# Patient Record
Sex: MALE | Race: WHITE | Employment: OTHER | URBAN - METROPOLITAN AREA
[De-identification: names, ages, dates, MRNs, and addresses within clinical notes are randomized per-mention and may not be internally consistent; named-entity substitution may affect disease eponyms.]

---

## 2017-02-06 ENCOUNTER — OFFICE VISIT (OUTPATIENT)
Dept: INTERNAL MEDICINE CLINIC | Facility: CLINIC | Age: 82
End: 2017-02-06

## 2017-02-06 VITALS
BODY MASS INDEX: 27.62 KG/M2 | WEIGHT: 176 LBS | HEIGHT: 67 IN | SYSTOLIC BLOOD PRESSURE: 132 MMHG | RESPIRATION RATE: 16 BRPM | DIASTOLIC BLOOD PRESSURE: 88 MMHG | HEART RATE: 76 BPM

## 2017-02-06 DIAGNOSIS — I10 ESSENTIAL HYPERTENSION WITH GOAL BLOOD PRESSURE LESS THAN 140/90: Primary | ICD-10-CM

## 2017-02-06 DIAGNOSIS — N40.0 BENIGN NON-NODULAR PROSTATIC HYPERPLASIA WITHOUT LOWER URINARY TRACT SYMPTOMS: ICD-10-CM

## 2017-02-06 DIAGNOSIS — N18.30 KIDNEY DISEASE, CHRONIC, STAGE III (GFR 30-59 ML/MIN) (HCC): ICD-10-CM

## 2017-02-06 PROCEDURE — 99214 OFFICE O/P EST MOD 30 MIN: CPT | Performed by: INTERNAL MEDICINE

## 2017-02-06 PROCEDURE — G0463 HOSPITAL OUTPT CLINIC VISIT: HCPCS | Performed by: INTERNAL MEDICINE

## 2017-02-06 NOTE — PROGRESS NOTES
Katie Hart is a 80year old male. HPI:   1. Essential hypertension with goal blood pressure less than 140/90    Patient has been following low salt diet and has been taking anti-hypertensive prescriptions as prescribed.  Blood pressure has been checked denies cough or shortness of breath with exertion  CARDIOVASCULAR: denies chest pain or palpitations  GI: denies abdominal pain, dyspepsia or change in appetite  : Denies dysuria/hematuria or change in  urination  NEURO: denies headaches/ visual changes/

## 2017-02-17 ENCOUNTER — LAB ENCOUNTER (OUTPATIENT)
Dept: LAB | Facility: HOSPITAL | Age: 82
End: 2017-02-17
Attending: INTERNAL MEDICINE
Payer: MEDICARE

## 2017-02-17 DIAGNOSIS — I10 ESSENTIAL HYPERTENSION WITH GOAL BLOOD PRESSURE LESS THAN 140/90: ICD-10-CM

## 2017-02-17 LAB
ALBUMIN SERPL BCP-MCNC: 3.9 G/DL (ref 3.5–4.8)
ALBUMIN/GLOB SERPL: 1.4 {RATIO} (ref 1–2)
ALP SERPL-CCNC: 56 U/L (ref 32–100)
ALT SERPL-CCNC: 15 U/L (ref 17–63)
ANION GAP SERPL CALC-SCNC: 8 MMOL/L (ref 0–18)
AST SERPL-CCNC: 23 U/L (ref 15–41)
BASOPHILS # BLD: 0 K/UL (ref 0–0.2)
BASOPHILS NFR BLD: 1 %
BILIRUB SERPL-MCNC: 1 MG/DL (ref 0.3–1.2)
BILIRUB UR QL: NEGATIVE
BUN SERPL-MCNC: 26 MG/DL (ref 8–20)
BUN/CREAT SERPL: 14.2 (ref 10–20)
CALCIUM SERPL-MCNC: 9.5 MG/DL (ref 8.5–10.5)
CHLORIDE SERPL-SCNC: 107 MMOL/L (ref 95–110)
CHOLEST SERPL-MCNC: 154 MG/DL (ref 110–200)
CO2 SERPL-SCNC: 25 MMOL/L (ref 22–32)
COLOR UR: YELLOW
CREAT SERPL-MCNC: 1.83 MG/DL (ref 0.5–1.5)
EOSINOPHIL # BLD: 0.3 K/UL (ref 0–0.7)
EOSINOPHIL NFR BLD: 5 %
ERYTHROCYTE [DISTWIDTH] IN BLOOD BY AUTOMATED COUNT: 14.1 % (ref 11–15)
GLOBULIN PLAS-MCNC: 2.8 G/DL (ref 2.5–3.7)
GLUCOSE SERPL-MCNC: 97 MG/DL (ref 70–99)
GLUCOSE UR-MCNC: NEGATIVE MG/DL
HCT VFR BLD AUTO: 39.6 % (ref 41–52)
HDLC SERPL-MCNC: 58 MG/DL
HGB BLD-MCNC: 13.1 G/DL (ref 13.5–17.5)
KETONES UR-MCNC: NEGATIVE MG/DL
LDLC SERPL CALC-MCNC: 75 MG/DL (ref 0–99)
LYMPHOCYTES # BLD: 1 K/UL (ref 1–4)
LYMPHOCYTES NFR BLD: 19 %
MCH RBC QN AUTO: 30.4 PG (ref 27–32)
MCHC RBC AUTO-ENTMCNC: 32.9 G/DL (ref 32–37)
MCV RBC AUTO: 92.4 FL (ref 80–100)
MONOCYTES # BLD: 0.7 K/UL (ref 0–1)
MONOCYTES NFR BLD: 13 %
NEUTROPHILS # BLD AUTO: 3.2 K/UL (ref 1.8–7.7)
NEUTROPHILS NFR BLD: 62 %
NITRITE UR QL STRIP.AUTO: NEGATIVE
NONHDLC SERPL-MCNC: 96 MG/DL
OSMOLALITY UR CALC.SUM OF ELEC: 295 MOSM/KG (ref 275–295)
PH UR: 6 [PH] (ref 5–8)
PLATELET # BLD AUTO: 178 K/UL (ref 140–400)
PMV BLD AUTO: 9 FL (ref 7.4–10.3)
POTASSIUM SERPL-SCNC: 4.2 MMOL/L (ref 3.3–5.1)
PROT SERPL-MCNC: 6.7 G/DL (ref 5.9–8.4)
PROT UR-MCNC: 30 MG/DL
RBC # BLD AUTO: 4.29 M/UL (ref 4.5–5.9)
RBC #/AREA URNS AUTO: 31 /HPF
SODIUM SERPL-SCNC: 140 MMOL/L (ref 136–144)
SP GR UR STRIP: 1.02 (ref 1–1.03)
TRIGL SERPL-MCNC: 104 MG/DL (ref 1–149)
TSH SERPL-ACNC: 2.13 UIU/ML (ref 0.34–5.6)
UROBILINOGEN UR STRIP-ACNC: <2
VIT C UR-MCNC: NEGATIVE MG/DL
WBC # BLD AUTO: 5.1 K/UL (ref 4–11)
WBC #/AREA URNS AUTO: 965 /HPF

## 2017-02-17 PROCEDURE — 84443 ASSAY THYROID STIM HORMONE: CPT

## 2017-02-17 PROCEDURE — 85025 COMPLETE CBC W/AUTO DIFF WBC: CPT

## 2017-02-17 PROCEDURE — 81001 URINALYSIS AUTO W/SCOPE: CPT

## 2017-02-17 PROCEDURE — 36415 COLL VENOUS BLD VENIPUNCTURE: CPT

## 2017-02-17 PROCEDURE — 80061 LIPID PANEL: CPT

## 2017-02-17 PROCEDURE — 80053 COMPREHEN METABOLIC PANEL: CPT

## 2017-02-25 ENCOUNTER — TELEPHONE (OUTPATIENT)
Dept: INTERNAL MEDICINE CLINIC | Facility: CLINIC | Age: 82
End: 2017-02-25

## 2017-02-25 RX ORDER — SULFAMETHOXAZOLE AND TRIMETHOPRIM 800; 160 MG/1; MG/1
1 TABLET ORAL 2 TIMES DAILY
Qty: 10 TABLET | Refills: 0 | OUTPATIENT
Start: 2017-02-25 | End: 2017-09-25

## 2017-02-25 NOTE — TELEPHONE ENCOUNTER
Blood test is good with normal  sugar, liver, thyroid and  Lipids. Minimal anemia is seen and could be due to the moderate kidney disease that continues. The urine show white blood cells and bacteria.  Call patent and if he is having UTI symptoms start bact

## 2017-04-11 RX ORDER — TERAZOSIN 5 MG/1
CAPSULE ORAL
Qty: 90 CAPSULE | Refills: 0 | Status: SHIPPED | OUTPATIENT
Start: 2017-04-11 | End: 2017-07-10

## 2017-07-10 DIAGNOSIS — E78.2 MIXED HYPERLIPIDEMIA: ICD-10-CM

## 2017-07-11 RX ORDER — TERAZOSIN 5 MG/1
CAPSULE ORAL
Qty: 90 CAPSULE | Refills: 0 | Status: SHIPPED | OUTPATIENT
Start: 2017-07-11 | End: 2017-09-25

## 2017-07-11 RX ORDER — SIMVASTATIN 20 MG
TABLET ORAL
Qty: 90 TABLET | Refills: 0 | Status: SHIPPED | OUTPATIENT
Start: 2017-07-11 | End: 2017-09-25

## 2017-09-25 ENCOUNTER — OFFICE VISIT (OUTPATIENT)
Dept: INTERNAL MEDICINE CLINIC | Facility: CLINIC | Age: 82
End: 2017-09-25

## 2017-09-25 VITALS
TEMPERATURE: 98 F | HEART RATE: 69 BPM | WEIGHT: 182 LBS | HEIGHT: 68 IN | BODY MASS INDEX: 27.58 KG/M2 | DIASTOLIC BLOOD PRESSURE: 70 MMHG | SYSTOLIC BLOOD PRESSURE: 109 MMHG

## 2017-09-25 DIAGNOSIS — I10 ESSENTIAL HYPERTENSION WITH GOAL BLOOD PRESSURE LESS THAN 140/90: Primary | ICD-10-CM

## 2017-09-25 DIAGNOSIS — N18.30 KIDNEY DISEASE, CHRONIC, STAGE III (GFR 30-59 ML/MIN) (HCC): ICD-10-CM

## 2017-09-25 DIAGNOSIS — E78.2 MIXED HYPERLIPIDEMIA: ICD-10-CM

## 2017-09-25 DIAGNOSIS — Z23 NEED FOR VACCINATION: ICD-10-CM

## 2017-09-25 PROCEDURE — 99214 OFFICE O/P EST MOD 30 MIN: CPT | Performed by: INTERNAL MEDICINE

## 2017-09-25 PROCEDURE — G0008 ADMIN INFLUENZA VIRUS VAC: HCPCS | Performed by: INTERNAL MEDICINE

## 2017-09-25 PROCEDURE — G0463 HOSPITAL OUTPT CLINIC VISIT: HCPCS | Performed by: INTERNAL MEDICINE

## 2017-09-25 PROCEDURE — 90653 IIV ADJUVANT VACCINE IM: CPT | Performed by: INTERNAL MEDICINE

## 2017-09-25 RX ORDER — SIMVASTATIN 20 MG
20 TABLET ORAL NIGHTLY
Qty: 90 TABLET | Refills: 3 | Status: SHIPPED | OUTPATIENT
Start: 2017-09-25

## 2017-09-25 RX ORDER — TERAZOSIN 5 MG/1
5 CAPSULE ORAL NIGHTLY
Qty: 90 CAPSULE | Refills: 0 | Status: SHIPPED | OUTPATIENT
Start: 2017-09-25 | End: 2018-01-04

## 2017-09-25 NOTE — PROGRESS NOTES
Nevin Kerr is a 80year old male. HPI:   1. Essential hypertension with goal blood pressure less than 140/90    Patient has been following low salt diet and has been taking anti-hypertensive prescriptions as prescribed.  Blood pressure has been checked well  SKIN: no unusual skin lesions or rashes  HEENT: No recent URI symptoms  RESPIRATORY: denies cough or shortness of breath with exertion  CARDIOVASCULAR: denies chest pain or palpitations  GI: denies abdominal pain, dyspepsia or change in appetite  : Drink plenty of fluid regularly. Will check blood test again. 4. Need for vaccination     Patient is to receive HIGH DOSE FLU SHOT TODAY. - FLU VACCINE ADJUVANT IM    The patient indicates understanding of these issues and agrees to the plan.     The

## 2017-10-09 DIAGNOSIS — E78.2 MIXED HYPERLIPIDEMIA: ICD-10-CM

## 2017-10-10 RX ORDER — SIMVASTATIN 20 MG
TABLET ORAL
Qty: 90 TABLET | Refills: 0 | OUTPATIENT
Start: 2017-10-10

## 2017-10-10 RX ORDER — TERAZOSIN 5 MG/1
CAPSULE ORAL
Qty: 90 CAPSULE | Refills: 0 | OUTPATIENT
Start: 2017-10-10

## 2017-10-25 ENCOUNTER — MED REC SCAN ONLY (OUTPATIENT)
Dept: INTERNAL MEDICINE CLINIC | Facility: CLINIC | Age: 82
End: 2017-10-25

## 2017-11-17 ENCOUNTER — LAB ENCOUNTER (OUTPATIENT)
Dept: LAB | Facility: HOSPITAL | Age: 82
End: 2017-11-17
Attending: INTERNAL MEDICINE
Payer: MEDICARE

## 2017-11-17 DIAGNOSIS — I10 ESSENTIAL HYPERTENSION WITH GOAL BLOOD PRESSURE LESS THAN 140/90: ICD-10-CM

## 2017-11-17 DIAGNOSIS — E78.2 MIXED HYPERLIPIDEMIA: ICD-10-CM

## 2017-11-17 PROCEDURE — 85025 COMPLETE CBC W/AUTO DIFF WBC: CPT

## 2017-11-17 PROCEDURE — 80053 COMPREHEN METABOLIC PANEL: CPT

## 2017-11-17 PROCEDURE — 84443 ASSAY THYROID STIM HORMONE: CPT

## 2017-11-17 PROCEDURE — 81001 URINALYSIS AUTO W/SCOPE: CPT

## 2017-11-17 PROCEDURE — 80061 LIPID PANEL: CPT

## 2017-11-17 PROCEDURE — 36415 COLL VENOUS BLD VENIPUNCTURE: CPT

## 2017-11-22 ENCOUNTER — TELEPHONE (OUTPATIENT)
Dept: INTERNAL MEDICINE CLINIC | Facility: CLINIC | Age: 82
End: 2017-11-22

## 2017-11-22 NOTE — TELEPHONE ENCOUNTER
----- Message from Mg Mejia MD sent at 11/21/2017 11:29 AM CST -----  Blood test is good with normal  sugar, liver, thyroid and lipids tests.  Kidney function and blood count remain somewhat low but have been stable from the last blood test. The

## 2018-01-04 RX ORDER — TERAZOSIN 5 MG/1
CAPSULE ORAL
Qty: 90 CAPSULE | Refills: 0 | Status: SHIPPED | OUTPATIENT
Start: 2018-01-04 | End: 2018-04-04

## 2018-04-05 RX ORDER — TERAZOSIN 5 MG/1
CAPSULE ORAL
Qty: 90 CAPSULE | Refills: 0 | Status: SHIPPED | OUTPATIENT
Start: 2018-04-05

## (undated) NOTE — MR AVS SNAPSHOT
[address] [address]  [phone]               Thank you for choosing us for your health care visit with Ninoska Marshall MD.  We are glad to serve you and happy to provide you with this s Assoc Dx:  Essential hypertension with goal blood pressure less than 140/90 [I10]           Lipid Panel [E]    Complete by:   Feb 06, 2017 (Approximate)    Assoc Dx:  Essential hypertension with goal blood pressure less than 140/90 [I10]           TSH [E]

## (undated) NOTE — LETTER
12/1/2017              [address] [place] [address],[address] [facility] [id number]         Dear [facility],    This letter is to inform you that our office has made several attempts to reach you by phone without success.   We were attempting to contact y